# Patient Record
Sex: FEMALE | Race: WHITE | NOT HISPANIC OR LATINO | Employment: FULL TIME | URBAN - NONMETROPOLITAN AREA
[De-identification: names, ages, dates, MRNs, and addresses within clinical notes are randomized per-mention and may not be internally consistent; named-entity substitution may affect disease eponyms.]

---

## 2018-11-26 RX ORDER — ALBUTEROL SULFATE 90 UG/1
2 AEROSOL, METERED RESPIRATORY (INHALATION) EVERY 4 HOURS PRN
COMMUNITY

## 2018-11-26 RX ORDER — CETIRIZINE HYDROCHLORIDE 10 MG/1
10 TABLET ORAL DAILY
COMMUNITY

## 2018-12-03 ENCOUNTER — OFFICE VISIT (OUTPATIENT)
Dept: CARDIOLOGY | Facility: CLINIC | Age: 33
End: 2018-12-03

## 2018-12-03 VITALS
OXYGEN SATURATION: 100 % | BODY MASS INDEX: 33.89 KG/M2 | SYSTOLIC BLOOD PRESSURE: 120 MMHG | WEIGHT: 179.5 LBS | HEIGHT: 61 IN | DIASTOLIC BLOOD PRESSURE: 88 MMHG | HEART RATE: 72 BPM

## 2018-12-03 DIAGNOSIS — R93.89 ABNORMAL CHEST X-RAY: Primary | ICD-10-CM

## 2018-12-03 DIAGNOSIS — E66.09 CLASS 1 OBESITY DUE TO EXCESS CALORIES WITHOUT SERIOUS COMORBIDITY WITH BODY MASS INDEX (BMI) OF 33.0 TO 33.9 IN ADULT: ICD-10-CM

## 2018-12-03 DIAGNOSIS — I51.7 CARDIOMEGALY: Primary | ICD-10-CM

## 2018-12-03 DIAGNOSIS — R06.09 DYSPNEA ON EXERTION: ICD-10-CM

## 2018-12-03 PROCEDURE — 99204 OFFICE O/P NEW MOD 45 MIN: CPT | Performed by: INTERNAL MEDICINE

## 2018-12-03 RX ORDER — EPINEPHRINE 0.3 MG/.3ML
INJECTION SUBCUTANEOUS
COMMUNITY

## 2018-12-03 RX ORDER — PRAVASTATIN SODIUM 40 MG
TABLET ORAL DAILY
Refills: 2 | COMMUNITY
Start: 2018-11-05

## 2018-12-03 NOTE — PATIENT INSTRUCTIONS
Echocardiogram  An echocardiogram, or echocardiography, uses sound waves (ultrasound) to produce an image of your heart. The echocardiogram is simple, painless, obtained within a short period of time, and offers valuable information to your health care provider. The images from an echocardiogram can provide information such as:  · Evidence of coronary artery disease (CAD).  · Heart size.  · Heart muscle function.  · Heart valve function.  · Aneurysm detection.  · Evidence of a past heart attack.  · Fluid buildup around the heart.  · Heart muscle thickening.  · Assess heart valve function.    Tell a health care provider about:  · Any allergies you have.  · All medicines you are taking, including vitamins, herbs, eye drops, creams, and over-the-counter medicines.  · Any problems you or family members have had with anesthetic medicines.  · Any blood disorders you have.  · Any surgeries you have had.  · Any medical conditions you have.  · Whether you are pregnant or may be pregnant.  What happens before the procedure?  No special preparation is needed. Eat and drink normally.  What happens during the procedure?  · In order to produce an image of your heart, gel will be applied to your chest and a wand-like tool (transducer) will be moved over your chest. The gel will help transmit the sound waves from the transducer. The sound waves will harmlessly bounce off your heart to allow the heart images to be captured in real-time motion. These images will then be recorded.  · You may need an IV to receive a medicine that improves the quality of the pictures.  What happens after the procedure?  You may return to your normal schedule including diet, activities, and medicines, unless your health care provider tells you otherwise.  This information is not intended to replace advice given to you by your health care provider. Make sure you discuss any questions you have with your health care provider.  Document Released: 12/15/2001  Document Revised: 08/05/2017 Document Reviewed: 08/25/2014  [employer] Interactive Patient Education © 2017 [employer]  Obese    Obesity, Adult  Obesity is having too much body fat. If you have a BMI of 30 or more, you are obese. BMI is a number that explains how much body fat you have. Obesity is often caused by taking in (consuming) more calories than your body uses.  Obesity can cause serious health problems. Changing your lifestyle can help to treat obesity.  Follow these instructions at home:  Eating and drinking    · Follow advice from your doctor about what to eat and drink. Your doctor may tell you to:  ? Cut down on (limit) fast foods, sweets, and processed snack foods.  ? Choose low-fat options. For example, choose low-fat milk instead of whole milk.  ? Eat 5 or more servings of fruits or vegetables every day.  ? Eat at home more often. This gives you more control over what you eat.  ? Choose healthy foods when you eat out.  ? Learn what a healthy portion size is. A portion size is the amount of a certain food that is healthy for you to eat at one time. This is different for each person.  ? Keep low-fat snacks available.  ? Avoid sugary drinks. These include soda, fruit juice, iced tea that is sweetened with sugar, and flavored milk.  ? Eat a healthy breakfast.  · Drink enough water to keep your pee (urine) clear or pale yellow.  · Do not go without eating for long periods of time (do not fast).  · Do not go on popular or trendy diets (fad diets).  Physical Activity  · Exercise often, as told by your doctor. Ask your doctor:  ? What types of exercise are safe for you.  ? How often you should exercise.  · Warm up and stretch before being active.  · Do slow stretching after being active (cool down).  · Rest between times of being active.  Lifestyle  · Limit how much time you spend in front of your TV, computer, or video game system (be less sedentary).  · Find ways to reward yourself that do not involve  food.  · Limit alcohol intake to no more than 1 drink a day for nonpregnant women and 2 drinks a day for men. One drink equals 12 oz of beer, 5 oz of wine, or 1½ oz of hard liquor.  General instructions  · Keep a weight loss journal. This can help you keep track of:  ? The food that you eat.  ? The exercise that you do.  · Take over-the-counter and prescription medicines only as told by your doctor.  · Take vitamins and supplements only as told by your doctor.  · Think about joining a support group. Your doctor may be able to help with this.  · Keep all follow-up visits as told by your doctor. This is important.  Contact a doctor if:  · You cannot meet your weight loss goal after you have changed your diet and lifestyle for 6 weeks.  This information is not intended to replace advice given to you by your health care provider. Make sure you discuss any questions you have with your health care provider.  Document Released: 03/11/2013 Document Revised: 05/25/2017 Document Reviewed: 10/05/2016  [employer] Interactive Patient Education © 2018 [employer] Inc.

## 2018-12-03 NOTE — PROGRESS NOTES
"   Cardiovascular Medicine      Brian Escobar M.D., Ph.D., [facility]           Thank you for asking me to see Autumn Ayala for abnormal chest x-ray.    History of Present Illness  This is a 32 y.o. female with:    1.  Abnormal chest x-ray  2. Dyspnea  3.  Obesity  4. Former smoker    Autumn Ayala is a 32 y.o. female who presents for consultation today.  She's currently prescribed albuterol and QVAR. These were prescribed for the abnormal PFTs. She was seeing an allergist. She tells me she had PFTs. She was told the PFTs were abnormal.  Chest stable dyspnea.  She received a chest x-ray which was interpreted as borderline heart size.  She tells me that she was sick at the time, so she \"did not pass\" the respiratory function tests. She does have a history of childhood asthma. She is not using inhalers. She tells me that prior to that chest x-ray that she had a chest x-ray several years ago which was interpreted as normal.  She also tells me that she has a history of a 2-D echocardiogram several years ago. She does have a 2D TTE in 2004 because she had a \"clot in her colon.\" Her dimensions were normal. Valve structures were normal. Other than her stable dyspnea, she is relatively asymptomatic from a cardiovascular standpoint.  No known history of MI, CHF or CAD.  No personal history of pulmonary hypertension or familial cardiomyopathy.  She's had no PND, orthopnea, lower extremity edema, dizziness, lightheadedness or syncope.    Review of Systems - Review of Systems   Cardiovascular: Positive for dyspnea on exertion. Negative for chest pain, claudication, cyanosis, irregular heartbeat, leg swelling, near-syncope, orthopnea, palpitations, paroxysmal nocturnal dyspnea and syncope.   Respiratory: Positive for shortness of breath. Negative for cough, sleep disturbances due to breathing, snoring, sputum production and wheezing.         All other systems were reviewed and were negative.    family " history includes Adrenal disorder in her mother; Cancer in her maternal grandfather and mother; Diabetes in her father and paternal grandfather; Endometriosis in her mother; Heart attack in her maternal grandfather; Hyperlipidemia in her mother, paternal grandfather, and paternal grandmother; Hypertension in her paternal grandfather and paternal grandmother; Irritable bowel syndrome in her sister; Skin cancer in her paternal grandfather and paternal grandmother; Stroke in her maternal grandmother; Thyroid cancer in her father and paternal grandmother; Ulcers in her sister.         Allergies   Allergen Reactions   • Augmentin [Amoxicillin-Pot Clavulanate] Unknown (See Comments)     unknown         Current Outpatient Medications:   •  albuterol (PROVENTIL HFA;VENTOLIN HFA) 108 (90 Base) MCG/ACT inhaler, Inhale 2 puffs Every 4 (Four) Hours As Needed for Wheezing., Disp: , Rfl:   •  EPINEPHrine (EPIPEN) 0.3 MG/0.3ML solution auto-injector injection, , Disp: , Rfl:   •  pravastatin (PRAVACHOL) 40 MG tablet, Daily., Disp: , Rfl: 2  •  beclomethasone (QVAR) 40 MCG/ACT inhaler, Inhale 2 puffs 2 (Two) Times a Day., Disp: , Rfl:   •  cetirizine (zyrTEC) 10 MG tablet, Take 10 mg by mouth Daily., Disp: , Rfl:     Physical Exam:  Physical Exam:  Vitals:    12/03/18 0937   BP: 120/88   Pulse:    SpO2:      Body mass index is 33.92 kg/m².   Pulse Ox: Normal  on room air  General: alert, appears stated age and cooperative     Body Habitus: overweight    HEENT: Head: Normocephalic, no lesions, without obvious abnormality. No arcus senilis, xanthelasma or xanthomas.    JVP: Volume/Pulsation: Normal  Carotid Exam: no bruit normal pulsation bilaterally   Carotid Volume: normal    Subclavian Bruit: absent  Vertebral Bruit: absent  Respirations: no increased work of breathing   Chest:  Normal    Pulmonary:Normal     Precordium: Normal impulses. P2 is not palpable.   [place]:  normal size and placement Palpable S4: absent.  Heart rate:  normal    Heart Rhythm: regular     Heart Sounds: S1: normal intensity  S2: normal intensity  S3: absent   S4: absent  Opening Snap: absent  A2-OS:  absent.   Pericardial rub: absent    Ejection click: None      Murmurs:  absent   Extremity: moves all extremities equally.   LE Skin: normal exam; no erythema, swelling or tenderness. no cyanosis, clubbing or edema  Pulses: 2+ and symmetric      DATA REVIEWED:         PCP notes showing dyspnea.  She is prescribed albuterol and Qvar.  She was sent for a chest x-ray which showed borderline heart size.      --------------------------------------------------------------------------------------------------  LABS:   No results found for: GLUCOSE, BUN, CREATININE, EGFRIFNONA, EGFRIFAFRI, BCR, K, CO2, CALCIUM, PROTENTOTREF, ALBUMIN, LABIL2, AST, ALT  No results found for: WBC, HGB, HCT, MCV, PLT  No results found for: CHOL, CHLPL, TRIG, HDL, LDL, LDLDIRECT  No results found for: TSH, Q3TTLCR, J9IYEHF, THYROIDAB  No results found for: CKTOTAL, CKMB, CKMBINDEX, TROPONINI, TROPONINT  No results found for: HGBA1C  No results found for: DDIMER  No results found for: ALT  No results found for: HGBA1C  No results found for: GLUF, MICROALBUR, CREATININE  No results found for: IRON, TIBC, FERRITIN  No results found for: INR, PROTIME    Assessment/Plan    Diagnosis Plan   1. Abnormal chest x-ray.  I did explain to her that CXR can exaggerate the cardiac silhouette.  I do think it's reasonable to proceed with an echocardiogram, especially since she is having dyspnea.   · 2-D echo    2.  Obese General patient education:  -Weight loss hand-out  -Exercise intervention:   Hand out, increase aerobic activity  -PCP Follow-up     Return in about 1 month (around 1/3/2019).

## 2018-12-17 LAB
BH CV ECHO MEAS - ACS: 1.9 CM
BH CV ECHO MEAS - AO ISTHMUS: 2.3 CM
BH CV ECHO MEAS - AO MAX PG (FULL): 3.8 MMHG
BH CV ECHO MEAS - AO MAX PG: 7.2 MMHG
BH CV ECHO MEAS - AO MEAN PG (FULL): 2 MMHG
BH CV ECHO MEAS - AO MEAN PG: 4 MMHG
BH CV ECHO MEAS - AO ROOT AREA (BSA CORRECTED): 1.3
BH CV ECHO MEAS - AO ROOT AREA: 4.5 CM^2
BH CV ECHO MEAS - AO ROOT DIAM: 2.4 CM
BH CV ECHO MEAS - AO V2 MAX: 134 CM/SEC
BH CV ECHO MEAS - AO V2 MEAN: 93.1 CM/SEC
BH CV ECHO MEAS - AO V2 VTI: 29.1 CM
BH CV ECHO MEAS - ASC AORTA: 2.8 CM
BH CV ECHO MEAS - AVA(I,A): 1.6 CM^2
BH CV ECHO MEAS - AVA(I,D): 1.6 CM^2
BH CV ECHO MEAS - AVA(V,A): 1.7 CM^2
BH CV ECHO MEAS - AVA(V,D): 1.7 CM^2
BH CV ECHO MEAS - BSA(HAYCOCK): 1.9 M^2
BH CV ECHO MEAS - BSA: 1.8 M^2
BH CV ECHO MEAS - BZI_BMI: 33.8 KILOGRAMS/M^2
BH CV ECHO MEAS - BZI_METRIC_HEIGHT: 154.9 CM
BH CV ECHO MEAS - BZI_METRIC_WEIGHT: 81.2 KG
BH CV ECHO MEAS - EDV(CUBED): 79.5 ML
BH CV ECHO MEAS - EDV(TEICH): 83.1 ML
BH CV ECHO MEAS - EF(CUBED): 66 %
BH CV ECHO MEAS - EF(TEICH): 57.9 %
BH CV ECHO MEAS - EPSS: 0.5 CM
BH CV ECHO MEAS - ESV(CUBED): 27 ML
BH CV ECHO MEAS - ESV(TEICH): 35 ML
BH CV ECHO MEAS - FS: 30.2 %
BH CV ECHO MEAS - IVS/LVPW: 1
BH CV ECHO MEAS - IVSD: 1 CM
BH CV ECHO MEAS - LA DIMENSION: 3.3 CM
BH CV ECHO MEAS - LA/AO: 1.4
BH CV ECHO MEAS - LV MASS(C)D: 142.5 GRAMS
BH CV ECHO MEAS - LV MASS(C)DI: 79.1 GRAMS/M^2
BH CV ECHO MEAS - LV MAX PG: 3.4 MMHG
BH CV ECHO MEAS - LV MEAN PG: 2 MMHG
BH CV ECHO MEAS - LV V1 MAX: 91.7 CM/SEC
BH CV ECHO MEAS - LV V1 MEAN: 55.2 CM/SEC
BH CV ECHO MEAS - LV V1 VTI: 18.6 CM
BH CV ECHO MEAS - LVIDD: 4.3 CM
BH CV ECHO MEAS - LVIDS: 3 CM
BH CV ECHO MEAS - LVOT AREA (M): 2.5 CM^2
BH CV ECHO MEAS - LVOT AREA: 2.5 CM^2
BH CV ECHO MEAS - LVOT DIAM: 1.8 CM
BH CV ECHO MEAS - LVPWD: 1 CM
BH CV ECHO MEAS - MV A MAX VEL: 65.2 CM/SEC
BH CV ECHO MEAS - MV E MAX VEL: 93.8 CM/SEC
BH CV ECHO MEAS - MV E/A: 1.4
BH CV ECHO MEAS - PA MAX PG: 5 MMHG
BH CV ECHO MEAS - PA MEAN PG: 3 MMHG
BH CV ECHO MEAS - PA V2 MAX: 112 CM/SEC
BH CV ECHO MEAS - PA V2 MEAN: 80.3 CM/SEC
BH CV ECHO MEAS - PA V2 VTI: 27.4 CM
BH CV ECHO MEAS - RAP SYSTOLE: 5 MMHG
BH CV ECHO MEAS - RVDD: 1.8 CM
BH CV ECHO MEAS - RVSP: 16.6 MMHG
BH CV ECHO MEAS - SI(AO): 73.1 ML/M^2
BH CV ECHO MEAS - SI(CUBED): 29.1 ML/M^2
BH CV ECHO MEAS - SI(LVOT): 26.3 ML/M^2
BH CV ECHO MEAS - SI(TEICH): 26.7 ML/M^2
BH CV ECHO MEAS - SV(AO): 131.6 ML
BH CV ECHO MEAS - SV(CUBED): 52.5 ML
BH CV ECHO MEAS - SV(LVOT): 47.3 ML
BH CV ECHO MEAS - SV(TEICH): 48.1 ML
BH CV ECHO MEAS - TR MAX VEL: 170 CM/SEC

## 2019-01-09 ENCOUNTER — OFFICE VISIT (OUTPATIENT)
Dept: CARDIOLOGY | Facility: CLINIC | Age: 34
End: 2019-01-09

## 2019-01-09 VITALS
HEART RATE: 87 BPM | BODY MASS INDEX: 33.99 KG/M2 | DIASTOLIC BLOOD PRESSURE: 70 MMHG | SYSTOLIC BLOOD PRESSURE: 118 MMHG | HEIGHT: 61 IN | WEIGHT: 180 LBS | OXYGEN SATURATION: 100 %

## 2019-01-09 DIAGNOSIS — I34.0 NON-RHEUMATIC MITRAL REGURGITATION: ICD-10-CM

## 2019-01-09 DIAGNOSIS — R93.89 ABNORMAL CHEST X-RAY: Primary | ICD-10-CM

## 2019-01-09 DIAGNOSIS — E66.09 CLASS 1 OBESITY DUE TO EXCESS CALORIES WITHOUT SERIOUS COMORBIDITY WITH BODY MASS INDEX (BMI) OF 33.0 TO 33.9 IN ADULT: ICD-10-CM

## 2019-01-09 PROCEDURE — 99213 OFFICE O/P EST LOW 20 MIN: CPT | Performed by: INTERNAL MEDICINE

## 2019-01-09 NOTE — PROGRESS NOTES
Cardiovascular Medicine      Brian Escobar M.D., Ph.D., [facility]           History of Present Illness  This is a 33 y.o. female with:    1.  Abnormal chest x-ray  2. Dyspnea  3.  Obesity  4. Former smoker  5.  Trace-to-mild mitral regurgitation    Autumn Ayala is a 33 y.o. female who returns to clinic today.  She was having dyspnea and had a chest x-ray that was interpreted as possible enlarged cardiac silhouette.  She had actually had a prior echocardiogram in the past with normal dimensions and no valvular abnormalities.  Because she was having dyspnea, she was sent for a 2-D echocardiogram.  She returns today for results.  This did show normal left ventricular and right ventricular function.  She has normal left ventricular dimensions.  She was noted have trace-to-mild mitral regurgitation.    Review of Systems - Review of Systems   Cardiovascular: Positive for dyspnea on exertion. Negative for chest pain, claudication, cyanosis, irregular heartbeat, leg swelling, near-syncope, orthopnea, palpitations, paroxysmal nocturnal dyspnea and syncope.   Respiratory: Positive for shortness of breath. Negative for cough, sleep disturbances due to breathing, snoring, sputum production and wheezing.         All other systems were reviewed and were negative.    family history includes Adrenal disorder in her mother; Cancer in her maternal grandfather and mother; Diabetes in her father and paternal grandfather; Endometriosis in her mother; Heart attack in her maternal grandfather; Hyperlipidemia in her mother, paternal grandfather, and paternal grandmother; Hypertension in her paternal grandfather and paternal grandmother; Irritable bowel syndrome in her sister; Skin cancer in her paternal grandfather and paternal grandmother; Stroke in her maternal grandmother; Thyroid cancer in her father and paternal grandmother; Ulcers in her sister.     reports that she has quit smoking. she has never used smokeless  tobacco. She reports that she drinks alcohol. She reports that she does not use drugs.    Allergies   Allergen Reactions   • Augmentin [Amoxicillin-Pot Clavulanate] GI Bleeding     unknown         Current Outpatient Medications:   •  albuterol (PROVENTIL HFA;VENTOLIN HFA) 108 (90 Base) MCG/ACT inhaler, Inhale 2 puffs Every 4 (Four) Hours As Needed for Wheezing., Disp: , Rfl:   •  beclomethasone (QVAR) 40 MCG/ACT inhaler, Inhale 2 puffs 2 (Two) Times a Day., Disp: , Rfl:   •  cetirizine (zyrTEC) 10 MG tablet, Take 10 mg by mouth Daily., Disp: , Rfl:   •  EPINEPHrine (EPIPEN) 0.3 MG/0.3ML solution auto-injector injection, , Disp: , Rfl:   •  pravastatin (PRAVACHOL) 40 MG tablet, Daily., Disp: , Rfl: 2    Physical Exam:  Physical Exam:  Vitals:    01/09/19 1501   BP: 118/70   Pulse: 87   SpO2: 100%     Body mass index is 34.01 kg/m².   Pulse Ox: Normal  on room air: Exam imported from prior visit  General: alert, appears stated age and cooperative     Body Habitus: overweight    HEENT: Head: Normocephalic, no lesions, without obvious abnormality. No arcus senilis, xanthelasma or xanthomas.    JVP: Volume/Pulsation: Normal    Precordium: Normal impulses. P2 is not palpable.   [place]:  normal size and placement Palpable S4: absent.  Heart rate: normal    Heart Rhythm: regular     Heart Sounds: S1: normal intensity  S2: normal intensity  S3: absent   S4: absent  Opening Snap: absent  A2-OS:  absent.   Pericardial rub: absent    Ejection click: None      Murmurs:  absent   Extremity: moves all extremities equally.       DATA REVIEWED:     Results for orders placed in visit on 12/03/18   Adult Transthoracic Echo Complete W/ Cont if Necessary Per Protocol    Narrative · Preserved systolic biventricular function. Normal LV chamber dimensions.   LV EF 60%. Normal diastolic function.  · Trace-to-mild mitral valve regurgitation is present           --------------------------------------------------------------------------------------------------  LABS:   No results found for: GLUCOSE, BUN, CREATININE, EGFRIFNONA, EGFRIFAFRI, BCR, K, CO2, CALCIUM, PROTENTOTREF, ALBUMIN, LABIL2, AST, ALT  No results found for: WBC, HGB, HCT, MCV, PLT  No results found for: CHOL, CHLPL, TRIG, HDL, LDL, LDLDIRECT  No results found for: TSH, H1APHLW, H5TFRTU, THYROIDAB  No results found for: CKTOTAL, CKMB, CKMBINDEX, TROPONINI, TROPONINT  No results found for: HGBA1C  No results found for: DDIMER  No results found for: ALT  No results found for: HGBA1C  No results found for: GLUF, MICROALBUR, CREATININE  No results found for: IRON, TIBC, FERRITIN  No results found for: INR, PROTIME    Assessment/Plan      Diagnosis Plan   1. Abnormal chest x-ray suggestive of cardiomegaly.  2-D echocardiogram showed normal cardiac dimensions.   · Reassurance    2. Non-rheumatic mitral regurgitation.  ACC stage B.  I discussed with her the trace to mild degree of mitral regurgitation.  Based on her current guidelines interval imaging would be recommended in 3-5 years unless her clinical exam suggests that she's had worsening valvular disease.   · Recommended that she have a ongoing clinical evaluation yearly with a repeat 2-D TTE in 3-5 years    3. Class 1 obesity due to excess calories without serious comorbidity with body mass index (BMI) of 33.0 to 33.9 in adult  · General patient education:  -Weight loss hand-out  -Exercise intervention:   Hand out, increase aerobic activity  -PCP Follow-up         Return if symptoms worsen or fail to improve.

## 2019-01-09 NOTE — PATIENT INSTRUCTIONS
Mitral Valve Regurgitation  Mitral valve regurgitation, also called mitral regurgitation, is a condition in which blood leaks from the mitral valve in the heart. The mitral valve is located between the upper left chamber (left atrium) and the lower left chamber (left ventricle) of the heart. Normally, this valve opens when the atrium pumps blood into the ventricle, and it closes when the ventricle pumps blood out to the body.  Mitral valve regurgitation happens when the mitral valve does not close properly. As a result, blood in the ventricle leaks back into the atrium. Mitral valve regurgitation causes the heart to work harder to pump blood. If the condition is mild, a person may not have symptoms. However, over time, this can lead to heart failure.  What are the causes?  This condition may be caused by:  · A condition in which the mitral valves do not close completely when the heart pumps blood (mitral valve prolapse).  · Infection, such as endocarditis or rheumatic fever.  · Damage to the mitral valve, such as from injury (trauma) to the heart, a problem present at birth (birth defect), or a heart attack.  · Certain medicines.    What increases the risk?  This condition is more likely to develop in people who have:  · Certain forms of heart disease.  · A family history of heart valve disease.  · Certain conditions that are present at birth (congenital).    You are also more likely to develop this condition if you have taken certain diet pills in the past.  What are the signs or symptoms?  Symptoms of this condition include:  · Shortness of breath with physical activity, like climbing stairs.  · Fast or irregular heartbeat.  · Cough.  · Suddenly waking up at night with difficulty breathing or needing to urinate.  · Heavy breathing.  · Extreme tiredness.  · Swelling in the lower legs, ankles, and feet.    In some cases of mild to moderate mitral regurgitation, there are no symptoms.  How is this diagnosed?  This  condition may be diagnosed based on the results of a physical exam. Your health care provider will listen to your heart for an abnormal heart sound (murmur). You may also have other tests, including:  · An echocardiogram. This test creates ultrasound images of the heart that allow your health care provider to see how the heart valves work while your heart is beating.  · Chest X-ray.  · Electrocardiogram (ECG). This is a test that records the electrical impulses of the heart.  · Cardiac catheterization. This test is used to look at the structure and function of the heart. A thin tube (catheter) is passed through the blood vessels and into the heart. Dye is injected into the blood vessels so the cardiac system can be seen on images that are taken.    How is this treated?  This condition may be treated with:  · Medicines. These may be given to treat symptoms and prevent complications.  · Surgery to repair or replace the mitral valve in severe, long-term (chronic) cases.    Follow these instructions at home:  Lifestyle  · Limit alcohol intake to no more than 1 drink a day for nonpregnant women and 2 drinks a day for men. One drink equals 12 oz of beer, 5 oz of wine, or 1½ oz of hard liquor.  · Do not use any products that contain nicotine or tobacco, such as cigarettes and e-cigarettes. If you need help quitting, ask your health care provider.  · Eat a heart-healthy diet that includes plenty of fresh fruits and vegetables, whole grains, low-fat (lean) protein, and low-fat dairy products. Consider working with a diet and nutrition specialist (dietitian) to help you make healthy food choices.  · Limit the amount of salt (sodium) in your diet. Avoid adding salt to foods, and avoid foods that are high in salt, such as:  ? Pickles.  ? Smoked and cured meats.  ? Processed foods.  · Maintain a healthy weight and stay physically active. Ask your health care provider to recommend activities that are safe for you.  · Try to get 7  or more hours of sleep each night.  · Find ways to manage stress. If you need help with this, ask your health care provider.  General instructions    · Take over-the-counter and prescription medicines only as told by your health care provider.  · Work closely with your health care provider to manage any other health conditions you have, such as diabetes or high blood pressure.  · If you plan to become pregnant, talk with your health care provider first.  · Keep all follow-up visits as told by your health care provider. This is important.  Contact a health care provider if:  · You have a fever.  · You feel more tired than usual when doing physical activity.  · You have a dry cough.  Get help right away if:  · You have shortness of breath.  · You develop chest pain.  · You have swelling in your hands, feet, ankles, or abdomen that is getting worse.  · You have trouble staying awake or you faint.  · You feel dizzy or unsteady.  · You suddenly gain weight.  · You feel confused.  · Any of your symptoms begin to get worse.  These symptoms may represent a serious problem that is an emergency. Do not wait to see if the symptoms will go away. Get medical help right away. Call your local emergency services (911 in the [place] Do not drive yourself to the hospital.  Summary  · Mitral valve regurgitation, also called mitral regurgitation, is a condition in which blood leaks from a valve between two chambers of the heart (mitral valve).  · Depending on how severe your condition is, you may be treated with medicines or surgery.  · Practice heart-healthy habits to manage this condition. These include limiting alcohol, avoiding nicotine and tobacco, and eating a balanced diet that is low in salt (sodium).  This information is not intended to replace advice given to you by your health care provider. Make sure you discuss any questions you have with your health care provider.  Document Released: 03/07/2006 Document Revised: 09/29/2017  Document Reviewed: 09/29/2017  [employer] Interactive Patient Education © 2018 [employer]

## 2023-05-18 ENCOUNTER — APPOINTMENT (OUTPATIENT)
Dept: GENERAL RADIOLOGY | Facility: HOSPITAL | Age: 38
End: 2023-05-18
Payer: COMMERCIAL

## 2023-05-18 ENCOUNTER — HOSPITAL ENCOUNTER (EMERGENCY)
Facility: HOSPITAL | Age: 38
Discharge: HOME | End: 2023-05-18
Attending: EMERGENCY MEDICINE
Payer: COMMERCIAL

## 2023-05-18 ENCOUNTER — APPOINTMENT (OUTPATIENT)
Dept: ULTRASOUND IMAGING | Facility: HOSPITAL | Age: 38
End: 2023-05-18
Payer: COMMERCIAL

## 2023-05-18 VITALS
RESPIRATION RATE: 20 BRPM | TEMPERATURE: 97.5 F | DIASTOLIC BLOOD PRESSURE: 86 MMHG | HEART RATE: 68 BPM | SYSTOLIC BLOOD PRESSURE: 126 MMHG | WEIGHT: 185 LBS | HEIGHT: 61 IN | BODY MASS INDEX: 34.93 KG/M2 | OXYGEN SATURATION: 98 %

## 2023-05-18 DIAGNOSIS — K80.50 BILIARY COLIC: Primary | ICD-10-CM

## 2023-05-18 LAB
ALBUMIN SERPL-MCNC: 4.3 G/DL (ref 3.5–5.2)
ALBUMIN/GLOB SERPL: 1.2 G/DL
ALP SERPL-CCNC: 76 U/L (ref 39–117)
ALT SERPL W P-5'-P-CCNC: 26 U/L (ref 1–33)
ANION GAP SERPL CALCULATED.3IONS-SCNC: 13 MMOL/L (ref 5–15)
AST SERPL-CCNC: 16 U/L (ref 1–32)
BACTERIA UR QL AUTO: ABNORMAL /HPF
BASOPHILS # BLD AUTO: 0.06 10*3/MM3 (ref 0–0.2)
BASOPHILS NFR BLD AUTO: 0.5 % (ref 0–1.5)
BILIRUB SERPL-MCNC: 0.4 MG/DL (ref 0–1.2)
BILIRUB UR QL STRIP: NEGATIVE
BUN SERPL-MCNC: 16 MG/DL (ref 6–20)
BUN/CREAT SERPL: 24.6 (ref 7–25)
CALCIUM SPEC-SCNC: 9.6 MG/DL (ref 8.6–10.5)
CHLORIDE SERPL-SCNC: 101 MMOL/L (ref 98–107)
CLARITY UR: CLEAR
CO2 SERPL-SCNC: 23 MMOL/L (ref 22–29)
COLOR UR: YELLOW
CREAT SERPL-MCNC: 0.65 MG/DL (ref 0.57–1)
DEPRECATED RDW RBC AUTO: 38.5 FL (ref 37–54)
EGFRCR SERPLBLD CKD-EPI 2021: 115 ML/MIN/1.73
EOSINOPHIL # BLD AUTO: 0.07 10*3/MM3 (ref 0–0.4)
EOSINOPHIL NFR BLD AUTO: 0.5 % (ref 0.3–6.2)
ERYTHROCYTE [DISTWIDTH] IN BLOOD BY AUTOMATED COUNT: 12 % (ref 12.3–15.4)
GLOBULIN UR ELPH-MCNC: 3.7 GM/DL
GLUCOSE SERPL-MCNC: 97 MG/DL (ref 65–99)
GLUCOSE UR STRIP-MCNC: NEGATIVE MG/DL
HCG SERPL QL: NEGATIVE
HCT VFR BLD AUTO: 41 % (ref 34–46.6)
HGB BLD-MCNC: 14.2 G/DL (ref 12–15.9)
HGB UR QL STRIP.AUTO: NEGATIVE
HYALINE CASTS UR QL AUTO: ABNORMAL /LPF
IMM GRANULOCYTES # BLD AUTO: 0.04 10*3/MM3 (ref 0–0.05)
IMM GRANULOCYTES NFR BLD AUTO: 0.3 % (ref 0–0.5)
KETONES UR QL STRIP: ABNORMAL
LEUKOCYTE ESTERASE UR QL STRIP.AUTO: ABNORMAL
LIPASE SERPL-CCNC: 15 U/L (ref 13–60)
LYMPHOCYTES # BLD AUTO: 2.78 10*3/MM3 (ref 0.7–3.1)
LYMPHOCYTES NFR BLD AUTO: 21.1 % (ref 19.6–45.3)
MCH RBC QN AUTO: 30.2 PG (ref 26.6–33)
MCHC RBC AUTO-ENTMCNC: 34.6 G/DL (ref 31.5–35.7)
MCV RBC AUTO: 87.2 FL (ref 79–97)
MONOCYTES # BLD AUTO: 0.68 10*3/MM3 (ref 0.1–0.9)
MONOCYTES NFR BLD AUTO: 5.2 % (ref 5–12)
NEUTROPHILS NFR BLD AUTO: 72.4 % (ref 42.7–76)
NEUTROPHILS NFR BLD AUTO: 9.53 10*3/MM3 (ref 1.7–7)
NITRITE UR QL STRIP: NEGATIVE
NRBC BLD AUTO-RTO: 0 /100 WBC (ref 0–0.2)
PH UR STRIP.AUTO: 5.5 [PH] (ref 5–9)
PLATELET # BLD AUTO: 331 10*3/MM3 (ref 140–450)
PMV BLD AUTO: 10.4 FL (ref 6–12)
POTASSIUM SERPL-SCNC: 3.9 MMOL/L (ref 3.5–5.2)
PROT SERPL-MCNC: 8 G/DL (ref 6–8.5)
PROT UR QL STRIP: ABNORMAL
QT INTERVAL: 346 MS
QTC INTERVAL: 420 MS
RBC # BLD AUTO: 4.7 10*6/MM3 (ref 3.77–5.28)
RBC # UR STRIP: ABNORMAL /HPF
REF LAB TEST METHOD: ABNORMAL
SODIUM SERPL-SCNC: 137 MMOL/L (ref 136–145)
SP GR UR STRIP: 1.03 (ref 1–1.03)
SQUAMOUS #/AREA URNS HPF: ABNORMAL /HPF
TROPONIN T SERPL HS-MCNC: <6 NG/L
UROBILINOGEN UR QL STRIP: ABNORMAL
WBC # UR STRIP: ABNORMAL /HPF
WBC NRBC COR # BLD: 13.16 10*3/MM3 (ref 3.4–10.8)
WHOLE BLOOD HOLD COAG: NORMAL

## 2023-05-18 PROCEDURE — 93005 ELECTROCARDIOGRAM TRACING: CPT

## 2023-05-18 PROCEDURE — 25010000002 KETOROLAC TROMETHAMINE PER 15 MG: Performed by: EMERGENCY MEDICINE

## 2023-05-18 PROCEDURE — 84484 ASSAY OF TROPONIN QUANT: CPT | Performed by: EMERGENCY MEDICINE

## 2023-05-18 PROCEDURE — 85025 COMPLETE CBC W/AUTO DIFF WBC: CPT | Performed by: EMERGENCY MEDICINE

## 2023-05-18 PROCEDURE — 81001 URINALYSIS AUTO W/SCOPE: CPT | Performed by: EMERGENCY MEDICINE

## 2023-05-18 PROCEDURE — 76705 ECHO EXAM OF ABDOMEN: CPT

## 2023-05-18 PROCEDURE — 25010000002 ONDANSETRON PER 1 MG: Performed by: EMERGENCY MEDICINE

## 2023-05-18 PROCEDURE — 96375 TX/PRO/DX INJ NEW DRUG ADDON: CPT

## 2023-05-18 PROCEDURE — 96374 THER/PROPH/DIAG INJ IV PUSH: CPT

## 2023-05-18 PROCEDURE — 83690 ASSAY OF LIPASE: CPT | Performed by: EMERGENCY MEDICINE

## 2023-05-18 PROCEDURE — 96376 TX/PRO/DX INJ SAME DRUG ADON: CPT

## 2023-05-18 PROCEDURE — 80053 COMPREHEN METABOLIC PANEL: CPT | Performed by: EMERGENCY MEDICINE

## 2023-05-18 PROCEDURE — 99283 EMERGENCY DEPT VISIT LOW MDM: CPT

## 2023-05-18 PROCEDURE — 84703 CHORIONIC GONADOTROPIN ASSAY: CPT | Performed by: EMERGENCY MEDICINE

## 2023-05-18 RX ORDER — ONDANSETRON 2 MG/ML
4 INJECTION INTRAMUSCULAR; INTRAVENOUS ONCE
Status: COMPLETED | OUTPATIENT
Start: 2023-05-18 | End: 2023-05-18

## 2023-05-18 RX ORDER — ONDANSETRON 4 MG/1
4 TABLET, ORALLY DISINTEGRATING ORAL EVERY 8 HOURS PRN
Qty: 9 TABLET | Refills: 0 | Status: SHIPPED | OUTPATIENT
Start: 2023-05-18

## 2023-05-18 RX ORDER — KETOROLAC TROMETHAMINE 15 MG/ML
15 INJECTION, SOLUTION INTRAMUSCULAR; INTRAVENOUS ONCE
Status: COMPLETED | OUTPATIENT
Start: 2023-05-18 | End: 2023-05-18

## 2023-05-18 RX ORDER — HYDROCODONE BITARTRATE AND ACETAMINOPHEN 10; 325 MG/1; MG/1
0.5 TABLET ORAL EVERY 6 HOURS PRN
Qty: 9 TABLET | Refills: 0 | Status: SHIPPED | OUTPATIENT
Start: 2023-05-18

## 2023-05-18 RX ADMIN — ONDANSETRON 4 MG: 2 INJECTION INTRAMUSCULAR; INTRAVENOUS at 17:40

## 2023-05-18 RX ADMIN — ONDANSETRON 4 MG: 2 INJECTION INTRAMUSCULAR; INTRAVENOUS at 20:37

## 2023-05-18 RX ADMIN — KETOROLAC TROMETHAMINE 15 MG: 15 INJECTION, SOLUTION INTRAMUSCULAR; INTRAVENOUS at 17:50

## 2023-05-18 NOTE — Clinical Note
[facility] EMERGENCY DEPARTMENT  [address] [place] [id number]  Phone: [phone]    Autumn Ayala was seen and treated in our emergency department on 5/18/2023.  She may return to work on 05/22/2023.         Thank you for choosing [facility].    Todd Yousif, DO

## 2023-05-18 NOTE — ED PROVIDER NOTES
Subjective   History of Present Illness  This is a 37-year-old female who presents for evaluation of right upper quadrant abdominal pain that is radiate to the right shoulder and right shoulder blade.  Symptoms began a couple of hours ago fairly abruptly after eating a burrito and some egg rolls.  The patient states that she has had more mild exacerbations of this pain in the same area in the past.  The pain is an aching cramping that is fairly sudden onset and has been constant.  Seems to be worse with eating.  Nothing seems to make better except for the passage of time.  She denies fevers or chills.  She has had nausea and nonbloody emesis.  She denies diarrhea or constipation.  She denies dysuria hematuria or frequency.  She denies vaginal discharge or bleeding.        Review of Systems   All other systems reviewed and are negative.      Past Medical History:   Diagnosis Date   • Allergy to alpha-gal    • Asthma    • Hyperlipidemia        Allergies   Allergen Reactions   • Augmentin [Amoxicillin-Pot Clavulanate] GI Bleeding     unknown       Past Surgical History:   Procedure Laterality Date   • ADENOIDECTOMY     • ENDOMETRIAL ABLATION     • LAPAROSCOPIC TUBAL LIGATION     • NASAL SEPTUM SURGERY         Family History   Problem Relation Age of Onset   • Adrenal disorder Mother    • Hyperlipidemia Mother    • Cancer Mother    • Endometriosis Mother    • Diabetes Father    • Thyroid cancer Father    • Irritable bowel syndrome Sister    • Ulcers Sister    • Stroke Maternal Grandmother    • Heart attack Maternal Grandfather    • Cancer Maternal Grandfather    • Hypertension Paternal Grandmother    • Thyroid cancer Paternal Grandmother    • Hyperlipidemia Paternal Grandmother    • Skin cancer Paternal Grandmother    • Diabetes Paternal Grandfather    • Skin cancer Paternal Grandfather    • Hyperlipidemia Paternal Grandfather    • Hypertension Paternal Grandfather        Social History     Socioeconomic History   •  "Marital status:    Tobacco Use   • Smoking status: Former   • Smokeless tobacco: Never   Substance and Sexual Activity   • Alcohol use: Yes     Comment: social   • Drug use: No   • Sexual activity: Defer           Objective   Physical Exam  Vitals and nursing note reviewed.   Constitutional:       Appearance: She is not ill-appearing.   HENT:      Head: Normocephalic and atraumatic.   Neck:      Vascular: No JVD.   Cardiovascular:      Rate and Rhythm: Normal rate and regular rhythm.      Heart sounds: Normal heart sounds.   Pulmonary:      Effort: Pulmonary effort is normal.      Breath sounds: Normal breath sounds.   Chest:      Chest wall: No tenderness.   Abdominal:      Palpations: Abdomen is soft.      Comments: Right upper quadrant tenderness that is moderate without rebound or guarding   Musculoskeletal:      Right lower leg: No edema.      Left lower leg: No edema.   Skin:     General: Skin is warm and dry.   Neurological:      Mental Status: She is alert and oriented to person, place, and time.   Psychiatric:         Mood and Affect: Mood is anxious.         Procedures           ED Course  ED Course as of 05/18/23 2226   Thu May 18, 2023   1916 CBC & Differential(!)  Leukocytosis [JV]      ED Course User Index  [JV] Todd Yousif, DO                                           Medical Decision Making  The patient's recent past medical charts for this facility as well as outside facilities via the \"care everywhere\" application of epic reviewed.  The patient was treated for bronchitis at an outside facility last month.  She saw cardiology for cardiomegaly and had an echo in 2019.    The differential diagnosis includes acute coronary syndrome, pancreatitis, peptic ulcer disease, and cholecystitis, among others.    On final reevaluation the patient is in stable condition.  We discussed discharge instructions and need for follow-up.  We discussed reasons for early return to the emergency " department.      Biliary colic: acute illness or injury  Amount and/or Complexity of Data Reviewed  Independent Historian: caregiver     Details: Several of the patient's family members are present and provide additional history regarding past work-ups and treatment.  Labs: ordered. Decision-making details documented in ED Course.  Radiology: ordered.  ECG/medicine tests: ordered and independent interpretation performed.     Details: EKG interpretation: Interpreted myself.  Normal sinus rhythm.  Rate 89.  Normal P wave and MA interval.  Normal QRS and axis.  Normal QTc interval.  ST segments are normal.      Risk  OTC drugs.  Prescription drug management.  Parenteral controlled substances.          Final diagnoses:   Biliary colic       ED Disposition  ED Disposition     ED Disposition   Discharge    Condition   Stable    Comment   --             Kenneth Robbins MD  [address] [address]  [phone]      As needed    Moise Gallagher MD  [address] [place] [address] Dr  [facility] [address] [place] [address]  [phone]    Call in 1 day  Make an appointment to be reevaluated after an emergency department visit for gallbladder pain.    [facility] EMERGENCY DEPARTMENT  [address]  [facility] [address] [phone]    As needed, If symptoms worsen at any time         Medication List      New Prescriptions    HYDROcodone-acetaminophen  MG per tablet  Commonly known as: NORCO  Take 0.5 tablets by mouth Every 6 (Six) Hours As Needed for Moderate Pain or Severe Pain.     ondansetron ODT 4 MG disintegrating tablet  Commonly known as: ZOFRAN-ODT  Place 1 tablet on the tongue Every 8 (Eight) Hours As Needed for Nausea or Vomiting.           Where to Get Your Medications      These medications were sent to [id number] - [place], [place] - [address] [facility] - [phone]  - [phone] FX  [address]  [address]    Phone: [phone]   · HYDROcodone-acetaminophen  MG per tablet  · ondansetron ODT 4 MG disintegrating tablet          Todd Yousif DO  05/18/23 7782

## 2023-05-19 ENCOUNTER — OFFICE VISIT (OUTPATIENT)
Dept: SURGERY | Facility: CLINIC | Age: 38
End: 2023-05-19
Payer: COMMERCIAL

## 2023-05-19 VITALS
HEIGHT: 61 IN | DIASTOLIC BLOOD PRESSURE: 68 MMHG | OXYGEN SATURATION: 98 % | TEMPERATURE: 97.5 F | BODY MASS INDEX: 34.74 KG/M2 | WEIGHT: 184 LBS | HEART RATE: 73 BPM | SYSTOLIC BLOOD PRESSURE: 120 MMHG

## 2023-05-19 DIAGNOSIS — R10.11 RIGHT UPPER QUADRANT ABDOMINAL PAIN: Primary | ICD-10-CM

## 2023-05-19 PROCEDURE — 99204 OFFICE O/P NEW MOD 45 MIN: CPT | Performed by: SURGERY

## 2023-05-19 NOTE — H&P (VIEW-ONLY)
Subjective   Autumn Ayala is a 37 y.o. 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  She had a severe episode yesterday and went to emergency room.  Work-up demonstrated mildly elevated white count but normal LFTs normal lipase and normal gallbladder ultrasound with normal biliary tree.  No stone seen.  Patient denies any history of pancreatitis or jaundice.  She does admit to family history of gallstones in various members as well as nonfunctioning gallbladder and her sister.  No prior upper abdominal surgery.  No further work-up as of yet.  Not really directly the postprandial last evening but may have been in the other episodes.  Other episodes were to a lesser degree than last evening    Review of Systems   Constitutional: Positive for appetite change and fatigue.   Eyes: Negative.    Respiratory: Negative.    Cardiovascular: Positive for chest pain.   Gastrointestinal: Positive for abdominal pain, nausea and vomiting.   Endocrine: Negative.    Genitourinary: Negative.    Musculoskeletal: Negative.    Skin: Negative.    Allergic/Immunologic: Negative.    Neurological: Positive for headaches.   Hematological: Negative.    Psychiatric/Behavioral: Negative.      Past Medical History:   Diagnosis Date   • Allergy to alpha-gal    • Asthma    • Hyperlipidemia      Past Surgical History:   Procedure Laterality Date   • ADENOIDECTOMY     • ENDOMETRIAL ABLATION     • LAPAROSCOPIC TUBAL LIGATION     • NASAL SEPTUM SURGERY       Family History   Problem Relation Age of Onset   • Adrenal disorder Mother    • Hyperlipidemia Mother    • Cancer Mother    • Endometriosis Mother    • Diabetes Father    • Thyroid cancer Father    • Irritable bowel syndrome Sister    • Ulcers Sister    • Stroke Maternal Grandmother    • Heart attack Maternal Grandfather    •  Cancer Maternal Grandfather    • Hypertension Paternal Grandmother    • Thyroid cancer Paternal Grandmother    • Hyperlipidemia Paternal Grandmother    • Skin cancer Paternal Grandmother    • Diabetes Paternal Grandfather    • Skin cancer Paternal Grandfather    • Hyperlipidemia Paternal Grandfather    • Hypertension Paternal Grandfather      Social History     Socioeconomic History   • Marital status:    Tobacco Use   • Smoking status: Former   • Smokeless tobacco: Never   Substance and Sexual Activity   • Alcohol use: Yes     Comment: social   • Drug use: No   • Sexual activity: Defer     Allergies   Allergen Reactions   • Augmentin [Amoxicillin-Pot Clavulanate] GI Bleeding     unknown     Vitals:    05/19/23 1354   BP: 120/68   Pulse: 73   Temp: 97.5 °F (36.4 °C)   SpO2: 98%       Home Medications:  Prior to Admission medications    Medication Sig Start Date End Date Taking? Authorizing Provider   HYDROcodone-acetaminophen (NORCO)  MG per tablet Take 0.5 tablets by mouth Every 6 (Six) Hours As Needed for Moderate Pain or Severe Pain. 5/18/23  Yes Todd Yousif, DO   ondansetron ODT (ZOFRAN-ODT) 4 MG disintegrating tablet Place 1 tablet on the tongue Every 8 (Eight) Hours As Needed for Nausea or Vomiting. 5/18/23  Yes Todd Yousif, DO   albuterol (PROVENTIL HFA;VENTOLIN HFA) 108 (90 Base) MCG/ACT inhaler Inhale 2 puffs Every 4 (Four) Hours As Needed for Wheezing.  Patient not taking: Reported on 5/19/2023    Victoria Lance MD   beclomethasone (QVAR) 40 MCG/ACT inhaler Inhale 2 puffs 2 (Two) Times a Day.    Victoria Lance MD   cetirizine (zyrTEC) 10 MG tablet Take 10 mg by mouth Daily.  Patient not taking: Reported on 5/19/2023    Victoria Lance MD   EPINEPHrine (EPIPEN) 0.3 MG/0.3ML solution auto-injector injection     Victoria Lance MD   pravastatin (PRAVACHOL) 40 MG tablet Daily. 11/5/18   Victoria Lance MD       Objective   Physical Exam  Constitutional:        General: She is not in acute distress.     Appearance: She is well-developed. She is not ill-appearing, toxic-appearing or diaphoretic.   HENT:      Head: Normocephalic and atraumatic.   Eyes:      General: No scleral icterus.     Conjunctiva/sclera: Conjunctivae normal.   Neck:      Thyroid: No thyromegaly.      Trachea: No tracheal deviation.   Cardiovascular:      Rate and Rhythm: Normal rate and regular rhythm.      Heart sounds: Normal heart sounds. No murmur heard.    No friction rub. No gallop.   Pulmonary:      Effort: Pulmonary effort is normal. No respiratory distress.      Breath sounds: Normal breath sounds. No stridor. No wheezing or rales.   Chest:      Chest wall: No tenderness.   Abdominal:      General: Bowel sounds are normal. There is no distension.      Palpations: Abdomen is soft. There is no mass.      Tenderness: There is abdominal tenderness. There is no guarding or rebound.      Hernia: No hernia is present.   Musculoskeletal:         General: No deformity. Normal range of motion.      Cervical back: Normal range of motion and neck supple.   Lymphadenopathy:      Cervical: No cervical adenopathy.   Skin:     General: Skin is warm and dry.      Coloration: Skin is not pale.      Findings: No erythema or rash.      Nails: There is no clubbing.   Neurological:      Mental Status: She is alert and oriented to person, place, and time.   Psychiatric:         Behavior: Behavior normal.         Thought Content: Thought content normal.     Mild tenderness in the right upper quadrant but no mass no peritoneal signs    Assessment & Plan Right upper quadrant pain with history to suggest biliary colic.  Normal work-up as far as her gallbladder is concerned at this point.  Fully discussed the pathophysiology involved using a pamphlet.  Would recommend she undergo a HIDA scan.  We will set the HIDA scan up and she will call us after the procedure for further recommendations.  We have already gone over the  surgery itself and what is involved both laparoscopically and robotically.  She understands she has any recurrent symptoms she will return directly to the emergency room in the meantime.      The encounter diagnosis was Right upper quadrant abdominal pain.                     This document has been electronically signed by Moise Gallagher MD on May 19, 2023 14:28 CDT        May 23, 2023 addendum to above note  Spoke with patient by phone today after her HIDA scan.  She had already checked on MyChart the results.  Ejection fraction was over 80%.  She said it did reproduce her discomfort and nausea to a lesser degree than in the past but very similar.  We talked about the results of that.  We talked about biliary dyskinesia and what that entails.  She says that she has noticed that the pain seems to be worse after she eats more so than described above.  Talked about possibly doing an EGD and further work-up.  She declines to have the EGD and wishes to go ahead and proceed with cholecystectomy.  She wishes to proceed with robotic cholecystectomy.  She clear understands the procedure alternatives risk and benefits and wishes to proceed.  She understands that she likely does not have gallstones and she may have similar symptoms after surgery that she had preoperatively.  She clearly understands the overall situation and wishes to proceed with surgery.  We will set her up for surgery on Thursday and my office will call her at her cell phone number

## 2023-05-19 NOTE — DISCHARGE INSTRUCTIONS
Please follow the gallbladder eating plan very closely to avoid further pain and nausea.  In the event that pain or nausea does return I have prescribed medication.  Please call surgery and make an appointment to follow-up to consider elective removal of your gallbladder.  If anything worsens such as fever, return of severe pain or vomiting then please do return to the emergency department for reevaluation.

## 2023-05-19 NOTE — PROGRESS NOTES
Subjective   Autumn Ayala is a 37 y.o. 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  She had a severe episode yesterday and went to emergency room.  Work-up demonstrated mildly elevated white count but normal LFTs normal lipase and normal gallbladder ultrasound with normal biliary tree.  No stone seen.  Patient denies any history of pancreatitis or jaundice.  She does admit to family history of gallstones in various members as well as nonfunctioning gallbladder and her sister.  No prior upper abdominal surgery.  No further work-up as of yet.  Not really directly the postprandial last evening but may have been in the other episodes.  Other episodes were to a lesser degree than last evening    Review of Systems   Constitutional: Positive for appetite change and fatigue.   Eyes: Negative.    Respiratory: Negative.    Cardiovascular: Positive for chest pain.   Gastrointestinal: Positive for abdominal pain, nausea and vomiting.   Endocrine: Negative.    Genitourinary: Negative.    Musculoskeletal: Negative.    Skin: Negative.    Allergic/Immunologic: Negative.    Neurological: Positive for headaches.   Hematological: Negative.    Psychiatric/Behavioral: Negative.      Past Medical History:   Diagnosis Date   • Allergy to alpha-gal    • Asthma    • Hyperlipidemia      Past Surgical History:   Procedure Laterality Date   • ADENOIDECTOMY     • ENDOMETRIAL ABLATION     • LAPAROSCOPIC TUBAL LIGATION     • NASAL SEPTUM SURGERY       Family History   Problem Relation Age of Onset   • Adrenal disorder Mother    • Hyperlipidemia Mother    • Cancer Mother    • Endometriosis Mother    • Diabetes Father    • Thyroid cancer Father    • Irritable bowel syndrome Sister    • Ulcers Sister    • Stroke Maternal Grandmother    • Heart attack Maternal Grandfather    •  Cancer Maternal Grandfather    • Hypertension Paternal Grandmother    • Thyroid cancer Paternal Grandmother    • Hyperlipidemia Paternal Grandmother    • Skin cancer Paternal Grandmother    • Diabetes Paternal Grandfather    • Skin cancer Paternal Grandfather    • Hyperlipidemia Paternal Grandfather    • Hypertension Paternal Grandfather      Social History     Socioeconomic History   • Marital status:    Tobacco Use   • Smoking status: Former   • Smokeless tobacco: Never   Substance and Sexual Activity   • Alcohol use: Yes     Comment: social   • Drug use: No   • Sexual activity: Defer     Allergies   Allergen Reactions   • Augmentin [Amoxicillin-Pot Clavulanate] GI Bleeding     unknown     Vitals:    05/19/23 1354   BP: 120/68   Pulse: 73   Temp: 97.5 °F (36.4 °C)   SpO2: 98%       Home Medications:  Prior to Admission medications    Medication Sig Start Date End Date Taking? Authorizing Provider   HYDROcodone-acetaminophen (NORCO)  MG per tablet Take 0.5 tablets by mouth Every 6 (Six) Hours As Needed for Moderate Pain or Severe Pain. 5/18/23  Yes Todd Yousif, DO   ondansetron ODT (ZOFRAN-ODT) 4 MG disintegrating tablet Place 1 tablet on the tongue Every 8 (Eight) Hours As Needed for Nausea or Vomiting. 5/18/23  Yes Todd Yousif, DO   albuterol (PROVENTIL HFA;VENTOLIN HFA) 108 (90 Base) MCG/ACT inhaler Inhale 2 puffs Every 4 (Four) Hours As Needed for Wheezing.  Patient not taking: Reported on 5/19/2023    Victoria Lance MD   beclomethasone (QVAR) 40 MCG/ACT inhaler Inhale 2 puffs 2 (Two) Times a Day.    Victoria Lance MD   cetirizine (zyrTEC) 10 MG tablet Take 10 mg by mouth Daily.  Patient not taking: Reported on 5/19/2023    Victoria Lance MD   EPINEPHrine (EPIPEN) 0.3 MG/0.3ML solution auto-injector injection     Victoria Lance MD   pravastatin (PRAVACHOL) 40 MG tablet Daily. 11/5/18   Victoria Lance MD       Objective   Physical Exam  Constitutional:        General: She is not in acute distress.     Appearance: She is well-developed. She is not ill-appearing, toxic-appearing or diaphoretic.   HENT:      Head: Normocephalic and atraumatic.   Eyes:      General: No scleral icterus.     Conjunctiva/sclera: Conjunctivae normal.   Neck:      Thyroid: No thyromegaly.      Trachea: No tracheal deviation.   Cardiovascular:      Rate and Rhythm: Normal rate and regular rhythm.      Heart sounds: Normal heart sounds. No murmur heard.    No friction rub. No gallop.   Pulmonary:      Effort: Pulmonary effort is normal. No respiratory distress.      Breath sounds: Normal breath sounds. No stridor. No wheezing or rales.   Chest:      Chest wall: No tenderness.   Abdominal:      General: Bowel sounds are normal. There is no distension.      Palpations: Abdomen is soft. There is no mass.      Tenderness: There is abdominal tenderness. There is no guarding or rebound.      Hernia: No hernia is present.   Musculoskeletal:         General: No deformity. Normal range of motion.      Cervical back: Normal range of motion and neck supple.   Lymphadenopathy:      Cervical: No cervical adenopathy.   Skin:     General: Skin is warm and dry.      Coloration: Skin is not pale.      Findings: No erythema or rash.      Nails: There is no clubbing.   Neurological:      Mental Status: She is alert and oriented to person, place, and time.   Psychiatric:         Behavior: Behavior normal.         Thought Content: Thought content normal.     Mild tenderness in the right upper quadrant but no mass no peritoneal signs    Assessment & Plan Right upper quadrant pain with history to suggest biliary colic.  Normal work-up as far as her gallbladder is concerned at this point.  Fully discussed the pathophysiology involved using a pamphlet.  Would recommend she undergo a HIDA scan.  We will set the HIDA scan up and she will call us after the procedure for further recommendations.  We have already gone over the  surgery itself and what is involved both laparoscopically and robotically.  She understands she has any recurrent symptoms she will return directly to the emergency room in the meantime.      The encounter diagnosis was Right upper quadrant abdominal pain.                     This document has been electronically signed by Moise Gallagher MD on May 19, 2023 14:28 CDT

## 2023-05-22 ENCOUNTER — HOSPITAL ENCOUNTER (OUTPATIENT)
Dept: NUCLEAR MEDICINE | Facility: HOSPITAL | Age: 38
Discharge: HOME | End: 2023-05-22
Payer: COMMERCIAL

## 2023-05-22 DIAGNOSIS — R10.11 RIGHT UPPER QUADRANT ABDOMINAL PAIN: ICD-10-CM

## 2023-05-22 PROCEDURE — A9537 TC99M MEBROFENIN: HCPCS | Performed by: SURGERY

## 2023-05-22 PROCEDURE — 78226 HEPATOBILIARY SYSTEM IMAGING: CPT

## 2023-05-22 PROCEDURE — 0 TECHNETIUM TC 99M MEBROFENIN KIT: Performed by: SURGERY

## 2023-05-22 RX ORDER — KIT FOR THE PREPARATION OF TECHNETIUM TC 99M MEBROFENIN 45 MG/10ML
1 INJECTION, POWDER, LYOPHILIZED, FOR SOLUTION INTRAVENOUS
Status: COMPLETED | OUTPATIENT
Start: 2023-05-22 | End: 2023-05-22

## 2023-05-22 RX ADMIN — MEBROFENIN 1 DOSE: 45 INJECTION, POWDER, LYOPHILIZED, FOR SOLUTION INTRAVENOUS at 12:33

## 2023-05-23 ENCOUNTER — PREP FOR SURGERY (OUTPATIENT)
Dept: OTHER | Facility: HOSPITAL | Age: 38
End: 2023-05-23
Payer: COMMERCIAL

## 2023-05-23 DIAGNOSIS — K81.9 CHOLECYSTITIS: Primary | ICD-10-CM

## 2023-05-23 RX ORDER — SODIUM CHLORIDE 9 MG/ML
40 INJECTION, SOLUTION INTRAVENOUS AS NEEDED
Status: CANCELLED | OUTPATIENT
Start: 2023-05-25

## 2023-05-23 RX ORDER — SODIUM CHLORIDE 0.9 % (FLUSH) 0.9 %
10 SYRINGE (ML) INJECTION EVERY 12 HOURS SCHEDULED
Status: CANCELLED | OUTPATIENT
Start: 2023-05-25

## 2023-05-23 RX ORDER — SODIUM CHLORIDE 0.9 % (FLUSH) 0.9 %
10 SYRINGE (ML) INJECTION AS NEEDED
Status: CANCELLED | OUTPATIENT
Start: 2023-05-25

## 2023-05-23 RX ORDER — INDOCYANINE GREEN AND WATER 25 MG
5 KIT INJECTION ONCE
Status: CANCELLED | OUTPATIENT
Start: 2023-05-25 | End: 2023-05-23

## 2023-05-24 ENCOUNTER — PRE-ADMISSION TESTING (OUTPATIENT)
Dept: PREADMISSION TESTING | Facility: HOSPITAL | Age: 38
End: 2023-05-24
Payer: COMMERCIAL

## 2023-05-24 RX ORDER — SODIUM CHLORIDE, SODIUM GLUCONATE, SODIUM ACETATE, POTASSIUM CHLORIDE AND MAGNESIUM CHLORIDE 526; 502; 368; 37; 30 MG/100ML; MG/100ML; MG/100ML; MG/100ML; MG/100ML
1000 INJECTION, SOLUTION INTRAVENOUS CONTINUOUS PRN
Status: CANCELLED | OUTPATIENT
Start: 2023-05-25

## 2023-05-25 ENCOUNTER — ANESTHESIA (OUTPATIENT)
Dept: PERIOP | Facility: HOSPITAL | Age: 38
End: 2023-05-25
Payer: COMMERCIAL

## 2023-05-25 ENCOUNTER — HOSPITAL ENCOUNTER (OUTPATIENT)
Facility: HOSPITAL | Age: 38
Setting detail: HOSPITAL OUTPATIENT SURGERY
Discharge: HOME | End: 2023-05-25
Attending: SURGERY | Admitting: SURGERY
Payer: COMMERCIAL

## 2023-05-25 ENCOUNTER — ANESTHESIA EVENT (OUTPATIENT)
Dept: PERIOP | Facility: HOSPITAL | Age: 38
End: 2023-05-25
Payer: COMMERCIAL

## 2023-05-25 VITALS
SYSTOLIC BLOOD PRESSURE: 149 MMHG | BODY MASS INDEX: 34.59 KG/M2 | WEIGHT: 183.2 LBS | DIASTOLIC BLOOD PRESSURE: 70 MMHG | OXYGEN SATURATION: 98 % | RESPIRATION RATE: 12 BRPM | TEMPERATURE: 97.3 F | HEIGHT: 61 IN | HEART RATE: 56 BPM

## 2023-05-25 DIAGNOSIS — K81.9 CHOLECYSTITIS: ICD-10-CM

## 2023-05-25 PROCEDURE — 25010000002 INDOCYANINE GREEN 25 MG RECONSTITUTED SOLUTION: Performed by: SURGERY

## 2023-05-25 PROCEDURE — 25010000002 FENTANYL CITRATE (PF) 100 MCG/2ML SOLUTION: Performed by: NURSE ANESTHETIST, CERTIFIED REGISTERED

## 2023-05-25 PROCEDURE — 25010000002 HYDROMORPHONE 1 MG/ML SOLUTION: Performed by: ANESTHESIOLOGY

## 2023-05-25 PROCEDURE — 25010000002 MIDAZOLAM PER 1 MG: Performed by: NURSE ANESTHETIST, CERTIFIED REGISTERED

## 2023-05-25 PROCEDURE — 47562 LAPAROSCOPIC CHOLECYSTECTOMY: CPT | Performed by: SPECIALIST/TECHNOLOGIST, OTHER

## 2023-05-25 PROCEDURE — 25010000002 CEFAZOLIN PER 500 MG: Performed by: NURSE ANESTHETIST, CERTIFIED REGISTERED

## 2023-05-25 PROCEDURE — 25010000002 NEOSTIGMINE 10 MG/10ML SOLUTION: Performed by: NURSE ANESTHETIST, CERTIFIED REGISTERED

## 2023-05-25 PROCEDURE — 47562 LAPAROSCOPIC CHOLECYSTECTOMY: CPT | Performed by: SURGERY

## 2023-05-25 PROCEDURE — 25010000002 PROPOFOL 200 MG/20ML EMULSION: Performed by: NURSE ANESTHETIST, CERTIFIED REGISTERED

## 2023-05-25 PROCEDURE — 25010000002 DEXAMETHASONE PER 1 MG: Performed by: NURSE ANESTHETIST, CERTIFIED REGISTERED

## 2023-05-25 PROCEDURE — 25010000002 ONDANSETRON PER 1 MG: Performed by: NURSE ANESTHETIST, CERTIFIED REGISTERED

## 2023-05-25 RX ORDER — DEXAMETHASONE SODIUM PHOSPHATE 4 MG/ML
INJECTION, SOLUTION INTRA-ARTICULAR; INTRALESIONAL; INTRAMUSCULAR; INTRAVENOUS; SOFT TISSUE AS NEEDED
Status: DISCONTINUED | OUTPATIENT
Start: 2023-05-25 | End: 2023-05-25 | Stop reason: SURG

## 2023-05-25 RX ORDER — NEOSTIGMINE METHYLSULFATE 1 MG/ML
INJECTION, SOLUTION INTRAVENOUS AS NEEDED
Status: DISCONTINUED | OUTPATIENT
Start: 2023-05-25 | End: 2023-05-25 | Stop reason: SURG

## 2023-05-25 RX ORDER — NALOXONE HCL 0.4 MG/ML
0.4 VIAL (ML) INJECTION AS NEEDED
Status: DISCONTINUED | OUTPATIENT
Start: 2023-05-25 | End: 2023-05-25 | Stop reason: HOSPADM

## 2023-05-25 RX ORDER — SODIUM CHLORIDE 0.9 % (FLUSH) 0.9 %
10 SYRINGE (ML) INJECTION EVERY 12 HOURS SCHEDULED
Status: DISCONTINUED | OUTPATIENT
Start: 2023-05-25 | End: 2023-05-25 | Stop reason: HOSPADM

## 2023-05-25 RX ORDER — VECURONIUM BROMIDE 1 MG/ML
INJECTION, POWDER, LYOPHILIZED, FOR SOLUTION INTRAVENOUS AS NEEDED
Status: DISCONTINUED | OUTPATIENT
Start: 2023-05-25 | End: 2023-05-25 | Stop reason: SURG

## 2023-05-25 RX ORDER — SODIUM CHLORIDE, SODIUM GLUCONATE, SODIUM ACETATE, POTASSIUM CHLORIDE AND MAGNESIUM CHLORIDE 526; 502; 368; 37; 30 MG/100ML; MG/100ML; MG/100ML; MG/100ML; MG/100ML
1000 INJECTION, SOLUTION INTRAVENOUS CONTINUOUS PRN
Status: DISCONTINUED | OUTPATIENT
Start: 2023-05-25 | End: 2023-05-25 | Stop reason: HOSPADM

## 2023-05-25 RX ORDER — INDOCYANINE GREEN AND WATER 25 MG
5 KIT INJECTION ONCE
Status: COMPLETED | OUTPATIENT
Start: 2023-05-25 | End: 2023-05-25

## 2023-05-25 RX ORDER — LIDOCAINE HYDROCHLORIDE 20 MG/ML
INJECTION, SOLUTION EPIDURAL; INFILTRATION; INTRACAUDAL; PERINEURAL AS NEEDED
Status: DISCONTINUED | OUTPATIENT
Start: 2023-05-25 | End: 2023-05-25 | Stop reason: SURG

## 2023-05-25 RX ORDER — PROMETHAZINE HYDROCHLORIDE 25 MG/1
25 SUPPOSITORY RECTAL ONCE AS NEEDED
Status: DISCONTINUED | OUTPATIENT
Start: 2023-05-25 | End: 2023-05-25 | Stop reason: HOSPADM

## 2023-05-25 RX ORDER — EPHEDRINE SULFATE 50 MG/ML
5 INJECTION, SOLUTION INTRAVENOUS ONCE AS NEEDED
Status: DISCONTINUED | OUTPATIENT
Start: 2023-05-25 | End: 2023-05-25 | Stop reason: HOSPADM

## 2023-05-25 RX ORDER — ONDANSETRON 2 MG/ML
4 INJECTION INTRAMUSCULAR; INTRAVENOUS ONCE AS NEEDED
Status: DISCONTINUED | OUTPATIENT
Start: 2023-05-25 | End: 2023-05-25 | Stop reason: HOSPADM

## 2023-05-25 RX ORDER — DIPHENHYDRAMINE HYDROCHLORIDE 50 MG/ML
12.5 INJECTION INTRAMUSCULAR; INTRAVENOUS
Status: DISCONTINUED | OUTPATIENT
Start: 2023-05-25 | End: 2023-05-25 | Stop reason: HOSPADM

## 2023-05-25 RX ORDER — EPHEDRINE SULFATE 50 MG/ML
INJECTION INTRAVENOUS AS NEEDED
Status: DISCONTINUED | OUTPATIENT
Start: 2023-05-25 | End: 2023-05-25 | Stop reason: SURG

## 2023-05-25 RX ORDER — SODIUM CHLORIDE 9 MG/ML
40 INJECTION, SOLUTION INTRAVENOUS AS NEEDED
Status: DISCONTINUED | OUTPATIENT
Start: 2023-05-25 | End: 2023-05-25 | Stop reason: HOSPADM

## 2023-05-25 RX ORDER — HYDROCODONE BITARTRATE AND ACETAMINOPHEN 7.5; 325 MG/1; MG/1
1 TABLET ORAL EVERY 6 HOURS PRN
Qty: 10 TABLET | Refills: 0 | Status: SHIPPED | OUTPATIENT
Start: 2023-05-25

## 2023-05-25 RX ORDER — MIDAZOLAM HYDROCHLORIDE 1 MG/ML
INJECTION INTRAMUSCULAR; INTRAVENOUS AS NEEDED
Status: DISCONTINUED | OUTPATIENT
Start: 2023-05-25 | End: 2023-05-25 | Stop reason: SURG

## 2023-05-25 RX ORDER — BUPIVACAINE HYDROCHLORIDE AND EPINEPHRINE 5; 5 MG/ML; UG/ML
INJECTION, SOLUTION EPIDURAL; INTRACAUDAL; PERINEURAL AS NEEDED
Status: DISCONTINUED | OUTPATIENT
Start: 2023-05-25 | End: 2023-05-25 | Stop reason: HOSPADM

## 2023-05-25 RX ORDER — CEFAZOLIN SODIUM 1 G/3ML
INJECTION, POWDER, FOR SOLUTION INTRAMUSCULAR; INTRAVENOUS AS NEEDED
Status: DISCONTINUED | OUTPATIENT
Start: 2023-05-25 | End: 2023-05-25 | Stop reason: SURG

## 2023-05-25 RX ORDER — SCOLOPAMINE TRANSDERMAL SYSTEM 1 MG/1
1 PATCH, EXTENDED RELEASE TRANSDERMAL ONCE
Status: DISCONTINUED | OUTPATIENT
Start: 2023-05-25 | End: 2023-05-25 | Stop reason: HOSPADM

## 2023-05-25 RX ORDER — FLUMAZENIL 0.1 MG/ML
0.2 INJECTION INTRAVENOUS AS NEEDED
Status: DISCONTINUED | OUTPATIENT
Start: 2023-05-25 | End: 2023-05-25 | Stop reason: HOSPADM

## 2023-05-25 RX ORDER — FENTANYL CITRATE 50 UG/ML
INJECTION, SOLUTION INTRAMUSCULAR; INTRAVENOUS AS NEEDED
Status: DISCONTINUED | OUTPATIENT
Start: 2023-05-25 | End: 2023-05-25 | Stop reason: SURG

## 2023-05-25 RX ORDER — ONDANSETRON 2 MG/ML
INJECTION INTRAMUSCULAR; INTRAVENOUS AS NEEDED
Status: DISCONTINUED | OUTPATIENT
Start: 2023-05-25 | End: 2023-05-25 | Stop reason: SURG

## 2023-05-25 RX ORDER — PROPOFOL 10 MG/ML
INJECTION, EMULSION INTRAVENOUS AS NEEDED
Status: DISCONTINUED | OUTPATIENT
Start: 2023-05-25 | End: 2023-05-25 | Stop reason: SURG

## 2023-05-25 RX ORDER — PROMETHAZINE HYDROCHLORIDE 25 MG/1
25 TABLET ORAL ONCE AS NEEDED
Status: DISCONTINUED | OUTPATIENT
Start: 2023-05-25 | End: 2023-05-25 | Stop reason: HOSPADM

## 2023-05-25 RX ORDER — SODIUM CHLORIDE 0.9 % (FLUSH) 0.9 %
10 SYRINGE (ML) INJECTION AS NEEDED
Status: DISCONTINUED | OUTPATIENT
Start: 2023-05-25 | End: 2023-05-25 | Stop reason: HOSPADM

## 2023-05-25 RX ORDER — NALOXONE HYDROCHLORIDE 4 MG/.1ML
SPRAY NASAL
Qty: 2 EACH | Refills: 0 | Status: SHIPPED | OUTPATIENT
Start: 2023-05-25

## 2023-05-25 RX ADMIN — SODIUM CHLORIDE, SODIUM GLUCONATE, SODIUM ACETATE, POTASSIUM CHLORIDE AND MAGNESIUM CHLORIDE: 526; 502; 368; 37; 30 INJECTION, SOLUTION INTRAVENOUS at 12:01

## 2023-05-25 RX ADMIN — PROPOFOL 50 MG: 10 INJECTION, EMULSION INTRAVENOUS at 11:28

## 2023-05-25 RX ADMIN — DEXAMETHASONE SODIUM PHOSPHATE 4 MG: 4 INJECTION, SOLUTION INTRAMUSCULAR; INTRAVENOUS at 11:36

## 2023-05-25 RX ADMIN — GLYCOPYRROLATE 0.4 MCG: 0.2 INJECTION, SOLUTION INTRAMUSCULAR; INTRAVITREAL at 13:00

## 2023-05-25 RX ADMIN — FENTANYL CITRATE 50 MCG: 50 INJECTION, SOLUTION INTRAMUSCULAR; INTRAVENOUS at 12:02

## 2023-05-25 RX ADMIN — SCOLOPAMINE TRANSDERMAL SYSTEM 1 PATCH: 1 PATCH, EXTENDED RELEASE TRANSDERMAL at 10:29

## 2023-05-25 RX ADMIN — ONDANSETRON 4 MG: 2 INJECTION INTRAMUSCULAR; INTRAVENOUS at 11:36

## 2023-05-25 RX ADMIN — VECURONIUM BROMIDE 5.5 MG: 1 INJECTION, POWDER, LYOPHILIZED, FOR SOLUTION INTRAVENOUS at 11:36

## 2023-05-25 RX ADMIN — SODIUM CHLORIDE 1000 ML: 9 INJECTION, SOLUTION INTRAVENOUS at 10:28

## 2023-05-25 RX ADMIN — PROPOFOL 30 MG: 10 INJECTION, EMULSION INTRAVENOUS at 12:39

## 2023-05-25 RX ADMIN — CEFAZOLIN SODIUM 2 G: 1 INJECTION, POWDER, FOR SOLUTION INTRAMUSCULAR; INTRAVENOUS at 11:42

## 2023-05-25 RX ADMIN — SODIUM CHLORIDE 400 ML: 9 INJECTION, SOLUTION INTRAVENOUS at 12:01

## 2023-05-25 RX ADMIN — HYDROMORPHONE HYDROCHLORIDE 0.5 MG: 1 INJECTION, SOLUTION INTRAMUSCULAR; INTRAVENOUS; SUBCUTANEOUS at 13:27

## 2023-05-25 RX ADMIN — INDOCYANINE GREEN AND WATER 5 MG: KIT at 10:30

## 2023-05-25 RX ADMIN — PROPOFOL 150 MG: 10 INJECTION, EMULSION INTRAVENOUS at 11:26

## 2023-05-25 RX ADMIN — NEOSTIGMINE METHYLSULFATE 3 MG: 0.5 INJECTION INTRAVENOUS at 13:00

## 2023-05-25 RX ADMIN — LIDOCAINE HYDROCHLORIDE 70 MG: 20 INJECTION, SOLUTION EPIDURAL; INFILTRATION; INTRACAUDAL; PERINEURAL at 11:26

## 2023-05-25 RX ADMIN — EPHEDRINE SULFATE 10 MG: 50 INJECTION INTRAVENOUS at 11:43

## 2023-05-25 RX ADMIN — SODIUM CHLORIDE 400 ML: 9 INJECTION, SOLUTION INTRAVENOUS at 11:18

## 2023-05-25 RX ADMIN — SODIUM CHLORIDE 200 ML: 9 INJECTION, SOLUTION INTRAVENOUS at 11:40

## 2023-05-25 RX ADMIN — MIDAZOLAM HYDROCHLORIDE 2 MG: 1 INJECTION, SOLUTION INTRAMUSCULAR; INTRAVENOUS at 11:16

## 2023-05-25 RX ADMIN — VECURONIUM BROMIDE 2 MG: 1 INJECTION, POWDER, LYOPHILIZED, FOR SOLUTION INTRAVENOUS at 12:17

## 2023-05-25 RX ADMIN — FENTANYL CITRATE 50 MCG: 50 INJECTION, SOLUTION INTRAMUSCULAR; INTRAVENOUS at 11:51

## 2023-05-25 RX ADMIN — FENTANYL CITRATE 50 MCG: 50 INJECTION, SOLUTION INTRAMUSCULAR; INTRAVENOUS at 11:26

## 2023-05-25 RX ADMIN — ONDANSETRON 4 MG: 2 INJECTION INTRAMUSCULAR; INTRAVENOUS at 13:08

## 2023-05-25 RX ADMIN — FENTANYL CITRATE 50 MCG: 50 INJECTION, SOLUTION INTRAMUSCULAR; INTRAVENOUS at 12:41

## 2023-05-25 RX ADMIN — VECURONIUM BROMIDE 0.5 MG: 1 INJECTION, POWDER, LYOPHILIZED, FOR SOLUTION INTRAVENOUS at 11:26

## 2023-05-25 NOTE — ANESTHESIA PREPROCEDURE EVALUATION
Anesthesia Evaluation     Patient summary reviewed and Nursing notes reviewed   history of anesthetic complications (Scopalamine patch ordered pre-op.): PONV  NPO Solid Status: > 8 hours  NPO Liquid Status: > 8 hours           Airway   Mallampati: II  TM distance: >3 FB  Neck ROM: full  possible difficult intubation  Dental    (+) poor dentation    Pulmonary     breath sounds clear to auscultation  (+) a smoker (Vapes) Current Abstained day of surgery, asthma,  (-) shortness of breath    ROS comment: PA and Lateral Chest:  The bones are intact.  The heart is normal.  The lungs are clear.  Exam End: 04/16/23 18:08      Cardiovascular     ECG reviewed  Rhythm: regular  Rate: normal    (+) valvular problems/murmurs MR, PRUITT, hyperlipidemia,   (-) past MI, angina, murmur, cardiac stents    ROS comment: Normal sinus rhythm  Normal ECG  No previous ECGs available     Referred By:            Confirmed By:     Neuro/Psych- negative ROS  GI/Hepatic/Renal/Endo - negative ROS     Musculoskeletal (-) negative ROS    Abdominal   (+) obese,    Substance History - negative use     OB/GYN negative ob/gyn ROS   (-)  Pregnant (Previous tubal ligation.)        Other - negative ROS       ROS/Med Hx Other: History of alpha gal.                  Anesthesia Plan    ASA 3     general     intravenous induction     Anesthetic plan, risks, benefits, and alternatives have been provided, discussed and informed consent has been obtained with: patient.  Pre-procedure education provided  Plan discussed with CRNA.        CODE STATUS:

## 2023-05-25 NOTE — INTERVAL H&P NOTE
H&P reviewed. The patient was examined and there are no changes to the H&P.      Temp:  [98.5 °F (36.9 °C)] 98.5 °F (36.9 °C)  Heart Rate:  [62] 62  Resp:  [18] 18  BP: (119)/(81) 119/81

## 2023-05-25 NOTE — ANESTHESIA POSTPROCEDURE EVALUATION
Patient: Autumn Trujillo Ayala    Procedure Summary     Date: 05/25/23 Room / Location: [facility] OR 09 / [facility] OR    Anesthesia Start: 1118 Anesthesia Stop: 1319    Procedure: CHOLECYSTECTOMY LAPAROSCOPIC WITH DAVINCI ROBOT (Abdomen) Diagnosis:       Cholecystitis      (Cholecystitis [K81.9])    Surgeons: Moise Gallagher MD Provider: Joana Keller CRNA    Anesthesia Type: general ASA Status: 3          Anesthesia Type: general    Vitals  Vitals Value Taken Time   /81 05/25/23 1313   Temp 98.2 °F (36.8 °C) 05/25/23 1313   Pulse 69 05/25/23 1313   Resp 16 05/25/23 1313   SpO2 100 % 05/25/23 1313           Post Anesthesia Care and Evaluation    Patient location during evaluation: PACU  Patient participation: complete - patient participated  Level of consciousness: awake and alert  Pain score: 0  Pain management: adequate    Airway patency: patent  Anesthetic complications: No anesthetic complications  PONV Status: controlled  Cardiovascular status: acceptable and hemodynamically stable  Respiratory status: acceptable, face mask and spontaneous ventilation  Hydration status: acceptable    Comments: ---------------------------               05/25/23                      1313         ---------------------------   BP:          156/81         Pulse:         69           Resp:          16           Temp:   98.2 °F (36.8 °C)   SpO2:         100%         ---------------------------

## 2023-05-25 NOTE — ANESTHESIA PROCEDURE NOTES
Airway  Urgency: elective    Date/Time: 5/25/2023 11:27 AM  Airway not difficult    General Information and Staff    Patient location during procedure: OR    Indications and Patient Condition  Indications for airway management: airway protection    Preoxygenated: yes  MILS maintained throughout  Mask difficulty assessment: 0 - not attempted    Final Airway Details  Final airway type: endotracheal airway      Successful airway: ETT  Cuffed: yes   Successful intubation technique: direct laryngoscopy  Facilitating devices/methods: intubating stylet  Endotracheal tube insertion site: oral  Blade: Kezia  Blade size: 3  ETT size (mm): 7.0  Cormack-Lehane Classification: grade I - full view of glottis  Placement verified by: chest auscultation and capnometry   Measured from: teeth  ETT/EBT  to teeth (cm): 20  Number of attempts at approach: 1  Assessment: lips, teeth, and gum same as pre-op and atraumatic intubation

## 2023-05-25 NOTE — OP NOTE
CHOLECYSTECTOMY LAPAROSCOPIC WITH DAVINCI ROBOT  Procedure Note    Autumn Ayala  5/25/2023    Pre-op Diagnosis:   Cholecystitis [K81.9]    Post-op Diagnosis:     Post-Op Diagnosis Codes:     * Cholecystitis [K81.9]    Procedure/CPT® Codes:      Procedure(s):  CHOLECYSTECTOMY LAPAROSCOPIC WITH DAVINCI ROBOT    Surgeon(s):  Moise Gallagher MD    Anesthesia: General    Staff:   Circulator: Terell Mendoza RN; Willa Gould RN  Scrub Person: Clare Domingo; Sheree Mayorga RN  Assistant: Lorraine Schultz CSA    Assistant: Lorraine Schultz CSA was responsible for performing the following activities: Retraction, Suction, Irrigation, Suturing, Closing and Placing Dressing and their skilled assistance was necessary for the success of this case.     Estimated Blood Loss: minimal    Specimens:                ID Type Source Tests Collected by Time   A : gallbladder and contents Tissue Gallbladder TISSUE EXAM, [facility]) Moise Gallagher MD 5/25/2023 1205         Drains: * No LDAs found *    Indications:   37-year-old female presents with signs symptoms consistent with biliary colic.  Family history of gallbladder disease.  Normal gallbladder ultrasound without any stones and normal size common bile duct.  Normal LFTs.  HIDA scan reproduced her symptoms.  Patient likely has a diagnosis of bili dyskinesia and she is fully aware that she wishes to proceed with robotic cholecystectomy    Findings: Normal anatomy in Calot's triangle small cystic duct confirmed with firefly technology.    Complications: None    Procedure: The patient was brought to the operating room. She was placed under general endotracheal anesthesia without any difficulty. She had SCDs place and received Ancef IV antibiotics preoperatively. The abdomen was prepped and draped in normal sterile fashion. Appropriate time was taken. Everyone was in agreement. Using a 5 mm Visiport,  we gained access to the abdomen just over the  costal margin of the left upper quadrant without any real difficulty. Once we were in the abdomen we insufflated the abdomen with gas and then did a tap abdominal wall block on the right side with a total of 20 mL of 0.50% Marcaine. We placed an 8 mm trocar in the right lower quadrant laterally and another 8 mm robotic trocar to the right of umbilicus. We moved the camera back over to the first trocar and looked back at our Visiport. There was no evidence of any injury to any structures in the left upper quadrant. Tap abdominal wall block was performed on that side with a total of 10 mL of 0.50% Marcaine. We then placed an 8 mm robotic trocar to the left of the umbilicus and then removed the 5 mm Visiport trocar and then placed an 8 mm robotic trocar at that site. The last trocar is trocar #4, the trocar to the left of midline is trocar #3 and then trocar to the right of midline is trocar #2 and the right lower quadrant trocar is trocar #1. Fenestrated grasper was placed in 1, camera placed in 2, hook in 3, and the grasper is placed in 4. We put the camera in to position, positioned the patient and then targeted and docked the robot without any real difficulty. We grasped the gallbladder with the grasper and retracted the gallbladder anteriorly and superiorly. It had omental adhesions up to the gallbladder. We took those down using gentle blunt dissection and use of the hook. Anterior aspect of the gallbladder is freed up completely. We retracted the gallbladder anteriorly and superiorly with the grasper through trocar #4. We then retracted the infundibulum of the gallbladder laterally and opened the peritoneum on the anterior and posterior aspects of the Calot's triangle using the hook. Using firefly technology we could see where the cystic duct was. We did not have the common bile duct exposed at this point. Anatomy was confirmed though with our dissection and the firefly technology as far as throughout the  gallbladder and cystic duct was concerned. The cystic artery was dissected out and medialized. We were able to get around the neck of the gallbladder junction with the cystic duct and obtained a good critical view of safety. We confirmed this with firefly technology. We then placed 1 clip proximally on the cystic artery, 2 clips proximally on the cystic duct, 1 clip distally on the cystic duct. We then divided the cystic artery with cautery and scissors and then the cystic duct was divided with scissors leaving 2 clips proximally on the cystic duct and 1 clip proximally on the cystic artery. We then dissected the gallbladder out of the bed of the liver using cautery and scissors. The gallbladder was completely removed without any spillage of bile or contents. There were some adhesions up over the edge of the liver consistent with history of perihepatitis in the past. The scissors were removed from trocar #3 and bag was placed into trocar #3. Gallbladder was placed into the bag and then removed through trocar #3 without any difficulty. Other instruments were removed. We had good hemostasis. Clips were all in good position at the completion of dissection. Trocars were removed. Skin was closed at each site with 4-0 Monocryl subcuticular stitch. Glue was used as final skin closure at all sites. The patient was awakened, extubated and transferred to the recovery room in awake and stable condition.               Disposition: Transfer to recovery in stable condition        Moise Gallagher MD     Date: 5/25/2023  Time: 12:57 CDT

## 2023-05-26 LAB
QT INTERVAL: 346 MS
QTC INTERVAL: 420 MS

## 2023-05-30 LAB — REF LAB TEST METHOD: NORMAL

## 2023-05-31 ENCOUNTER — OFFICE VISIT (OUTPATIENT)
Dept: SURGERY | Facility: CLINIC | Age: 38
End: 2023-05-31

## 2023-05-31 VITALS
DIASTOLIC BLOOD PRESSURE: 68 MMHG | OXYGEN SATURATION: 99 % | WEIGHT: 180 LBS | TEMPERATURE: 97.1 F | HEIGHT: 61 IN | SYSTOLIC BLOOD PRESSURE: 110 MMHG | HEART RATE: 81 BPM | BODY MASS INDEX: 33.99 KG/M2

## 2023-05-31 DIAGNOSIS — K81.9 CHOLECYSTITIS: Primary | ICD-10-CM

## 2023-05-31 PROCEDURE — 99024 POSTOP FOLLOW-UP VISIT: CPT | Performed by: SURGERY

## 2023-05-31 NOTE — PROGRESS NOTES
37-year-old female who is 1 week out from her robotic cholecystectomy for chronic cholecystitis.  Went over pathology with her answered all of her questions.  Nonicteric abdomen soft incisions clean and intact.  Overall patient is doing very well.  She will follow-up with us on appearing basis